# Patient Record
Sex: FEMALE | Race: WHITE | NOT HISPANIC OR LATINO
[De-identification: names, ages, dates, MRNs, and addresses within clinical notes are randomized per-mention and may not be internally consistent; named-entity substitution may affect disease eponyms.]

---
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
## 2024-03-06 ENCOUNTER — APPOINTMENT (OUTPATIENT)
Dept: PEDIATRICS | Facility: CLINIC | Age: 19
End: 2024-03-06
Payer: COMMERCIAL

## 2024-03-06 VITALS — TEMPERATURE: 97.9 F | WEIGHT: 142 LBS

## 2024-03-06 PROCEDURE — 99215 OFFICE O/P EST HI 40 MIN: CPT

## 2024-03-06 RX ORDER — ONDANSETRON 8 MG/1
8 TABLET, ORALLY DISINTEGRATING ORAL
Qty: 4 | Refills: 0 | Status: DISCONTINUED | COMMUNITY
Start: 2022-09-24 | End: 2024-03-06

## 2024-03-06 RX ORDER — AMOXICILLIN AND CLAVULANATE POTASSIUM 500; 125 MG/1; MG/1
500-125 TABLET, FILM COATED ORAL
Qty: 20 | Refills: 0 | Status: DISCONTINUED | COMMUNITY
Start: 2023-09-29 | End: 2024-03-06

## 2024-03-06 NOTE — HISTORY OF PRESENT ILLNESS
[de-identified] : CONSULT [FreeTextEntry6] : on going issues w anxiety had been under the care of a psychiatirst in the past DR TANNER Pt had been on medication w bad side effects Amiblify, Zoloft and propranolol had bad side effects w OCP has NOT had any therapy very emotional and stressed at times gets panic attacks and needs to go to the emergency room Pt is currently in her first year of college, has boy friend and is in clubs no suicidal ideation or attempts

## 2024-03-06 NOTE — DISCUSSION/SUMMARY
[FreeTextEntry1] : TEEN SCREEN POS = 22 DISCUSSED AT LENGTH ADVISED SCHOOL HEALTH PLAN  W SCHOOL PSYCHOLOGICAL SERVICES FOR CONSISTANT THERAPY AND MANAGMENT  TOTAL TIME 40 MIN

## 2024-04-08 ENCOUNTER — APPOINTMENT (OUTPATIENT)
Dept: PEDIATRICS | Facility: CLINIC | Age: 19
End: 2024-04-08

## 2024-05-20 ENCOUNTER — APPOINTMENT (OUTPATIENT)
Dept: PEDIATRICS | Facility: CLINIC | Age: 19
End: 2024-05-20
Payer: COMMERCIAL

## 2024-05-20 VITALS — OXYGEN SATURATION: 98 % | TEMPERATURE: 97.2 F | HEART RATE: 104 BPM

## 2024-05-20 DIAGNOSIS — R09.81 OTHER SPECIFIED COUGH: ICD-10-CM

## 2024-05-20 DIAGNOSIS — R05.8 OTHER SPECIFIED COUGH: ICD-10-CM

## 2024-05-20 PROCEDURE — 99213 OFFICE O/P EST LOW 20 MIN: CPT

## 2024-05-20 NOTE — HISTORY OF PRESENT ILLNESS
[de-identified] : cough past week [FreeTextEntry6] : increasing worsening cough past 2 weeks extremely phlegmy no fever congested as well green phlegm tried OTC meds like mucinex and zpak but didnt help

## 2024-05-20 NOTE — PHYSICAL EXAM
[Mucoid Discharge] : mucoid discharge [Clear to Auscultation Bilaterally] : clear to auscultation bilaterally [NL] : warm, clear [FreeTextEntry7] : extremely productive wet cough

## 2024-05-23 ENCOUNTER — APPOINTMENT (OUTPATIENT)
Dept: PEDIATRICS | Facility: CLINIC | Age: 19
End: 2024-05-23
Payer: COMMERCIAL

## 2024-05-23 VITALS
SYSTOLIC BLOOD PRESSURE: 110 MMHG | BODY MASS INDEX: 24.12 KG/M2 | DIASTOLIC BLOOD PRESSURE: 64 MMHG | WEIGHT: 143 LBS | TEMPERATURE: 97.9 F | HEIGHT: 64.5 IN

## 2024-05-23 DIAGNOSIS — M54.9 DORSALGIA, UNSPECIFIED: ICD-10-CM

## 2024-05-23 DIAGNOSIS — Z87.42 PERSONAL HISTORY OF OTHER DISEASES OF THE FEMALE GENITAL TRACT: ICD-10-CM

## 2024-05-23 DIAGNOSIS — Z78.9 OTHER SPECIFIED HEALTH STATUS: ICD-10-CM

## 2024-05-23 DIAGNOSIS — F41.9 ANXIETY DISORDER, UNSPECIFIED: ICD-10-CM

## 2024-05-23 DIAGNOSIS — Z00.00 ENCOUNTER FOR GENERAL ADULT MEDICAL EXAMINATION W/OUT ABNORMAL FINDINGS: ICD-10-CM

## 2024-05-23 LAB
BASOPHILS # BLD AUTO: 0.03 K/UL
BASOPHILS NFR BLD AUTO: 0.5 %
BILIRUB UR QL STRIP: NEGATIVE
CHOLEST SERPL-MCNC: 173 MG/DL
EOSINOPHIL # BLD AUTO: 0.16 K/UL
EOSINOPHIL NFR BLD AUTO: 2.5 %
GLUCOSE UR-MCNC: NEGATIVE
HCG UR QL: 0.2 EU/DL
HCT VFR BLD CALC: 37.3 %
HDLC SERPL-MCNC: 61 MG/DL
HGB BLD-MCNC: 13.2 G/DL
HGB UR QL STRIP.AUTO: NORMAL
IMM GRANULOCYTES NFR BLD AUTO: 0.2 %
KETONES UR-MCNC: NEGATIVE
LDLC SERPL CALC-MCNC: 92 MG/DL
LEUKOCYTE ESTERASE UR QL STRIP: NORMAL
LYMPHOCYTES # BLD AUTO: 2.19 K/UL
LYMPHOCYTES NFR BLD AUTO: 34.1 %
MAN DIFF?: NORMAL
MCHC RBC-ENTMCNC: 29.9 PG
MCHC RBC-ENTMCNC: 35.4 GM/DL
MCV RBC AUTO: 84.6 FL
MONOCYTES # BLD AUTO: 0.44 K/UL
MONOCYTES NFR BLD AUTO: 6.8 %
NEUTROPHILS # BLD AUTO: 3.6 K/UL
NEUTROPHILS NFR BLD AUTO: 55.9 %
NITRITE UR QL STRIP: NEGATIVE
NONHDLC SERPL-MCNC: 112 MG/DL
PH UR STRIP: 6.5
PLATELET # BLD AUTO: 333 K/UL
PROT UR STRIP-MCNC: NEGATIVE
RBC # BLD: 4.41 M/UL
RBC # FLD: 11.9 %
SP GR UR STRIP: 1.02
TRIGL SERPL-MCNC: 110 MG/DL
WBC # FLD AUTO: 6.43 K/UL

## 2024-05-23 PROCEDURE — 36415 COLL VENOUS BLD VENIPUNCTURE: CPT

## 2024-05-23 PROCEDURE — 81003 URINALYSIS AUTO W/O SCOPE: CPT | Mod: QW

## 2024-05-23 PROCEDURE — 92551 PURE TONE HEARING TEST AIR: CPT

## 2024-05-23 PROCEDURE — 99395 PREV VISIT EST AGE 18-39: CPT

## 2024-05-23 RX ORDER — DESONIDE 0.5 MG/G
0.05 OINTMENT TOPICAL
Refills: 0 | Status: DISCONTINUED | COMMUNITY
End: 2024-05-23

## 2024-05-23 RX ORDER — AMOXICILLIN AND CLAVULANATE POTASSIUM 875; 125 MG/1; MG/1
875-125 TABLET, COATED ORAL
Qty: 20 | Refills: 0 | Status: DISCONTINUED | COMMUNITY
Start: 2024-05-20 | End: 2024-05-23

## 2024-05-23 NOTE — PHYSICAL EXAM

## 2024-05-23 NOTE — HISTORY OF PRESENT ILLNESS
[Mother] : mother [Yes] : Patient goes to dentist yearly [Up to date] : Up to date [Normal] : normal [LMP: _____] : LMP: [unfilled] [Eats meals with family] : eats meals with family [Has family members/adults to turn to for help] : has family members/adults to turn to for help [Grade: ____] : Grade: [unfilled] [Normal Performance] : normal performance [Eats regular meals including adequate fruits and vegetables] : eats regular meals including adequate fruits and vegetables [Drinks non-sweetened liquids] : drinks non-sweetened liquids  [Has friends] : has friends [At least 1 hour of physical activity a day] : at least 1 hour of physical activity a day [Uses electronic nicotine delivery system] : does not use electronic nicotine delivery system [Exposure to electronic nicotine delivery system] : no exposure to electronic nicotine delivery system [Uses tobacco] : does not use tobacco [Exposure to tobacco] : no exposure to tobacco [Uses drugs] : does not use drugs  [Exposure to drugs] : no exposure to drugs [Drinks alcohol] : does not drink alcohol [Exposure to alcohol] : no exposure to alcohol [Uses safety belts/safety equipment] : uses safety belts/safety equipment  [No] : Patient has not had sexual intercourse. [Has ways to cope with stress] : has ways to cope with stress [Displays self-confidence] : displays self-confidence [Has problems with sleep] : has problems with sleep [Gets depressed, anxious, or irritable/has mood swings] : gets depressed, anxious, or irritable/has mood swings [Has thought about hurting self or considered suicide] : has not thought about hurting self or considered suicide [With Teen] : teen [FreeTextEntry7] : 17 yo well [de-identified] : issues w anxiety and menses [FreeTextEntry8] : bad heavy period and cramps [NO] : No [FreeTextEntry1] : ON NO MEDS SINCE IN SCHOOL NO LONGER SEEING PSYCH OR THERAPIST  ISSUES W HEAVY MENSES SEXUALLY ACTIVE HAS BOYRFIEND OFF OCP HAS NOT HAD GYN F.U  RECENT INFECTION SINUS ON AUGMENTIN  PND NO FEVER

## 2024-05-23 NOTE — RISK ASSESSMENT
[1] : 2) Feeling down, depressed, or hopeless for several days (1) [PHQ-2 Positive] : PHQ-2 Positive [PHQ-9 Positive] : PHQ-9 Positive [I have developed a follow-up plan documented below in the note.] : I have developed a follow-up plan documented below in the note. [[id number]] : 2

## 2024-05-23 NOTE — DISCUSSION/SUMMARY
[FreeTextEntry1] : Vaccine up to date Rec flu vac in fall Provided counseling on the diseases to be vaccinated against as well as the risks/benefits of providing and withholding recommended vaccines to be given today to NATHALY .All questions were answered and the parent verbalized understanding.  Hearing wl Sees optho  UA in office   CBC and Lipid sent to lab HEP C SCREENING SENT  PHQ9=17 -MUST RE START PSYCH AND THERPAY  REFER TO GYN  Discussed safety/diet/sleep as appropriate for age.  Time allowed for questions and all answered with understanding.

## 2024-05-24 LAB
C TRACH RRNA SPEC QL NAA+PROBE: NOT DETECTED
HCV AB SER QL: NONREACTIVE
HCV S/CO RATIO: 0.11 S/CO
N GONORRHOEA RRNA SPEC QL NAA+PROBE: NOT DETECTED
SOURCE AMPLIFICATION: NORMAL

## 2024-05-28 LAB
M TB IFN-G BLD-IMP: NEGATIVE
QUANTIFERON TB PLUS MITOGEN MINUS NIL: 8.91 IU/ML
QUANTIFERON TB PLUS NIL: 0.02 IU/ML
QUANTIFERON TB PLUS TB1 MINUS NIL: 0 IU/ML
QUANTIFERON TB PLUS TB2 MINUS NIL: 0 IU/ML

## 2024-05-30 ENCOUNTER — APPOINTMENT (OUTPATIENT)
Dept: PEDIATRICS | Facility: CLINIC | Age: 19
End: 2024-05-30
Payer: COMMERCIAL

## 2024-05-30 VITALS — TEMPERATURE: 97.3 F | OXYGEN SATURATION: 97 % | HEART RATE: 90 BPM

## 2024-05-30 VITALS — OXYGEN SATURATION: 98 % | HEART RATE: 93 BPM

## 2024-05-30 PROCEDURE — 94640 AIRWAY INHALATION TREATMENT: CPT

## 2024-05-30 PROCEDURE — 99214 OFFICE O/P EST MOD 30 MIN: CPT | Mod: 25

## 2024-05-30 PROCEDURE — 94664 DEMO&/EVAL PT USE INHALER: CPT | Mod: 59

## 2024-05-30 RX ORDER — ALBUTEROL SULFATE 2.5 MG/3ML
(2.5 MG/3ML) SOLUTION RESPIRATORY (INHALATION)
Qty: 0 | Refills: 0 | Status: COMPLETED | OUTPATIENT
Start: 2024-05-30

## 2024-05-30 RX ORDER — PREDNISONE 20 MG/1
20 TABLET ORAL DAILY
Qty: 8 | Refills: 0 | Status: COMPLETED | COMMUNITY
Start: 2024-05-30 | End: 2024-06-04

## 2024-05-30 RX ORDER — ALBUTEROL SULFATE 90 UG/1
108 (90 BASE) INHALANT RESPIRATORY (INHALATION)
Qty: 1 | Refills: 0 | Status: ACTIVE | COMMUNITY
Start: 2024-05-30 | End: 1900-01-01

## 2024-05-30 RX ORDER — IPRATROPIUM BROMIDE 0.5 MG/2.5ML
0.02 SOLUTION RESPIRATORY (INHALATION)
Qty: 0 | Refills: 0 | Status: COMPLETED | OUTPATIENT
Start: 2024-05-30

## 2024-05-30 RX ADMIN — IPRATROPIUM BROMIDE 1 %: 0.5 SOLUTION RESPIRATORY (INHALATION) at 00:00

## 2024-05-30 RX ADMIN — ALBUTEROL SULFATE 1 0.083%: 2.5 SOLUTION RESPIRATORY (INHALATION) at 00:00

## 2024-05-30 NOTE — HISTORY OF PRESENT ILLNESS
[de-identified] : cough [FreeTextEntry6] : cough non stop spasms completed Augmentin x 10 days no fever

## 2024-05-30 NOTE — DISCUSSION/SUMMARY
[FreeTextEntry1] : ALBUTEROL AND ATROVENT NEB GIVEN IN OFFICE GOOD RESPONSE DEMO SHOWN HOW TO USE INHALER  ALBUTEROL HFA 2 PUFFS Q4-6 PRN PREDNISONE X 5 DAYS RE CK 1 WK

## 2024-06-03 ENCOUNTER — APPOINTMENT (OUTPATIENT)
Dept: PEDIATRICS | Facility: CLINIC | Age: 19
End: 2024-06-03
Payer: COMMERCIAL

## 2024-06-03 ENCOUNTER — TRANSCRIPTION ENCOUNTER (OUTPATIENT)
Age: 19
End: 2024-06-03

## 2024-06-03 VITALS — OXYGEN SATURATION: 97 % | TEMPERATURE: 97 F | HEART RATE: 100 BPM

## 2024-06-03 DIAGNOSIS — J01.80 OTHER ACUTE SINUSITIS: ICD-10-CM

## 2024-06-03 DIAGNOSIS — Z11.59 ENCOUNTER FOR SCREENING FOR OTHER VIRAL DISEASES: ICD-10-CM

## 2024-06-03 DIAGNOSIS — Z11.3 ENCOUNTER FOR SCREENING FOR INFECTIONS WITH A PREDOMINANTLY SEXUAL MODE OF TRANSMISSION: ICD-10-CM

## 2024-06-03 DIAGNOSIS — J45.21 MILD INTERMITTENT ASTHMA WITH (ACUTE) EXACERBATION: ICD-10-CM

## 2024-06-03 DIAGNOSIS — Z72.51 HIGH RISK HETEROSEXUAL BEHAVIOR: ICD-10-CM

## 2024-06-03 PROCEDURE — 99213 OFFICE O/P EST LOW 20 MIN: CPT

## 2024-06-03 NOTE — HISTORY OF PRESENT ILLNESS
[de-identified] : re ck chest [FreeTextEntry6] : re ck chest completed prednisone x 5 days using albuterol inhaler, last inhaler last night cough is approx 60-70% improved no fever no past hx of asthma

## 2024-07-22 ENCOUNTER — APPOINTMENT (OUTPATIENT)
Dept: PEDIATRICS | Facility: CLINIC | Age: 19
End: 2024-07-22
Payer: COMMERCIAL

## 2024-07-22 VITALS
TEMPERATURE: 98.1 F | DIASTOLIC BLOOD PRESSURE: 80 MMHG | HEART RATE: 123 BPM | SYSTOLIC BLOOD PRESSURE: 121 MMHG | OXYGEN SATURATION: 98 %

## 2024-07-22 DIAGNOSIS — J98.8 OTHER SPECIFIED RESPIRATORY DISORDERS: ICD-10-CM

## 2024-07-22 DIAGNOSIS — R05.3 CHRONIC COUGH: ICD-10-CM

## 2024-07-22 DIAGNOSIS — J45.21 MILD INTERMITTENT ASTHMA WITH (ACUTE) EXACERBATION: ICD-10-CM

## 2024-07-22 DIAGNOSIS — R42 DIZZINESS AND GIDDINESS: ICD-10-CM

## 2024-07-22 PROCEDURE — 99213 OFFICE O/P EST LOW 20 MIN: CPT

## 2024-07-22 RX ORDER — CLARITHROMYCIN 500 MG/1
500 TABLET, FILM COATED ORAL TWICE DAILY
Qty: 20 | Refills: 0 | Status: ACTIVE | COMMUNITY
Start: 2024-07-22 | End: 1900-01-01

## 2024-07-22 NOTE — REVIEW OF SYSTEMS
[Cough] : cough [Lightheadness] : lightheadness [Negative] : Genitourinary [Fever] : no fever [Headache] : no headache [Tachypnea] : not tachypneic [Wheezing] : no wheezing [Vomiting] : no vomiting [Diarrhea] : no diarrhea

## 2024-07-22 NOTE — HISTORY OF PRESENT ILLNESS
[de-identified] : persistent cough [FreeTextEntry6] : presents with an ongoing issue of cough which has been recently addressed review of immediate past history indicates a course of a zpak followed by augmentin with use of albuterol and a 5 day course of prednisone with transient improvement. patient uses albuterol prn with some relief. she has c/o dizziness today as well. she is a camp counsellor with numerous exposures. she showed me a picture of phlegm which was coughed up today which is thick tenacious green.no fever present. currently not in any respiratory distress. patient as an aside has anxiety and feels she has a multitude of ailments including SURINDER.

## 2024-07-22 NOTE — DISCUSSION/SUMMARY
[FreeTextEntry1] : will opt for a course of clarithromycin  advise mucinex DM maximum strength  continue albuterol prn thoughts include change to symbicort as well as consult with an adult pulmonologist prn

## 2025-03-12 ENCOUNTER — APPOINTMENT (OUTPATIENT)
Dept: FAMILY MEDICINE | Facility: CLINIC | Age: 20
End: 2025-03-12
Payer: COMMERCIAL

## 2025-03-12 VITALS
HEIGHT: 64.5 IN | RESPIRATION RATE: 16 BRPM | SYSTOLIC BLOOD PRESSURE: 112 MMHG | TEMPERATURE: 97.2 F | BODY MASS INDEX: 23.78 KG/M2 | WEIGHT: 141 LBS | OXYGEN SATURATION: 98 % | HEART RATE: 96 BPM | DIASTOLIC BLOOD PRESSURE: 77 MMHG

## 2025-03-12 DIAGNOSIS — F41.9 ANXIETY DISORDER, UNSPECIFIED: ICD-10-CM

## 2025-03-12 DIAGNOSIS — Z82.49 FAMILY HISTORY OF ISCHEMIC HEART DISEASE AND OTHER DISEASES OF THE CIRCULATORY SYSTEM: ICD-10-CM

## 2025-03-12 DIAGNOSIS — Z00.00 ENCOUNTER FOR GENERAL ADULT MEDICAL EXAMINATION W/OUT ABNORMAL FINDINGS: ICD-10-CM

## 2025-03-12 DIAGNOSIS — Z80.1 FAMILY HISTORY OF MALIGNANT NEOPLASM OF TRACHEA, BRONCHUS AND LUNG: ICD-10-CM

## 2025-03-12 DIAGNOSIS — Z83.3 FAMILY HISTORY OF DIABETES MELLITUS: ICD-10-CM

## 2025-03-12 DIAGNOSIS — Z81.8 FAMILY HISTORY OF OTHER MENTAL AND BEHAVIORAL DISORDERS: ICD-10-CM

## 2025-03-12 DIAGNOSIS — Z13.1 ENCOUNTER FOR SCREENING FOR DIABETES MELLITUS: ICD-10-CM

## 2025-03-12 DIAGNOSIS — E55.9 VITAMIN D DEFICIENCY, UNSPECIFIED: ICD-10-CM

## 2025-03-12 DIAGNOSIS — Z80.8 FAMILY HISTORY OF MALIGNANT NEOPLASM OF OTHER ORGANS OR SYSTEMS: ICD-10-CM

## 2025-03-12 PROCEDURE — 99385 PREV VISIT NEW AGE 18-39: CPT

## 2025-03-12 PROCEDURE — 99213 OFFICE O/P EST LOW 20 MIN: CPT | Mod: 25

## 2025-03-12 RX ORDER — PROPRANOLOL HYDROCHLORIDE 20 MG/1
20 TABLET ORAL TWICE DAILY
Qty: 60 | Refills: 0 | Status: ACTIVE | COMMUNITY
Start: 2025-03-12 | End: 1900-01-01

## 2025-03-17 ENCOUNTER — APPOINTMENT (OUTPATIENT)
Dept: FAMILY MEDICINE | Facility: CLINIC | Age: 20
End: 2025-03-17